# Patient Record
Sex: FEMALE | Race: WHITE | NOT HISPANIC OR LATINO | Employment: FULL TIME | URBAN - METROPOLITAN AREA
[De-identification: names, ages, dates, MRNs, and addresses within clinical notes are randomized per-mention and may not be internally consistent; named-entity substitution may affect disease eponyms.]

---

## 2017-05-10 ENCOUNTER — HOSPITAL ENCOUNTER (OUTPATIENT)
Facility: MEDICAL CENTER | Age: 32
End: 2017-05-10
Attending: NURSE PRACTITIONER
Payer: COMMERCIAL

## 2017-05-10 PROCEDURE — 88175 CYTOPATH C/V AUTO FLUID REDO: CPT

## 2017-05-10 PROCEDURE — 87591 N.GONORRHOEAE DNA AMP PROB: CPT

## 2017-05-10 PROCEDURE — 87491 CHLMYD TRACH DNA AMP PROBE: CPT

## 2017-05-11 LAB
C TRACH DNA GENITAL QL NAA+PROBE: NEGATIVE
CYTOLOGY REG CYTOL: NORMAL
N GONORRHOEA DNA GENITAL QL NAA+PROBE: NEGATIVE
SPECIMEN SOURCE: NORMAL

## 2017-06-19 ENCOUNTER — HOSPITAL ENCOUNTER (OUTPATIENT)
Dept: RADIOLOGY | Facility: MEDICAL CENTER | Age: 32
End: 2017-06-19
Attending: NURSE PRACTITIONER
Payer: COMMERCIAL

## 2017-06-19 DIAGNOSIS — N63.0 BREAST LUMP: ICD-10-CM

## 2017-06-19 DIAGNOSIS — N63.0 LUMP OR MASS IN BREAST: ICD-10-CM

## 2017-06-19 PROCEDURE — 76642 ULTRASOUND BREAST LIMITED: CPT | Mod: LT

## 2017-06-19 PROCEDURE — G0204 DX MAMMO INCL CAD BI: HCPCS

## 2017-10-19 ENCOUNTER — OFFICE VISIT (OUTPATIENT)
Dept: URGENT CARE | Facility: CLINIC | Age: 32
End: 2017-10-19
Payer: COMMERCIAL

## 2017-10-19 VITALS
WEIGHT: 180 LBS | HEART RATE: 87 BPM | SYSTOLIC BLOOD PRESSURE: 102 MMHG | DIASTOLIC BLOOD PRESSURE: 76 MMHG | OXYGEN SATURATION: 96 % | HEIGHT: 65 IN | TEMPERATURE: 98.1 F | BODY MASS INDEX: 29.99 KG/M2

## 2017-10-19 DIAGNOSIS — J02.9 PHARYNGITIS, UNSPECIFIED ETIOLOGY: ICD-10-CM

## 2017-10-19 LAB
INT CON NEG: NEGATIVE
INT CON POS: POSITIVE
S PYO AG THROAT QL: NORMAL

## 2017-10-19 PROCEDURE — 99214 OFFICE O/P EST MOD 30 MIN: CPT | Performed by: FAMILY MEDICINE

## 2017-10-19 PROCEDURE — 87880 STREP A ASSAY W/OPTIC: CPT | Performed by: FAMILY MEDICINE

## 2017-10-19 RX ORDER — AZITHROMYCIN 250 MG/1
TABLET, FILM COATED ORAL
Qty: 6 TAB | Refills: 0 | Status: SHIPPED | OUTPATIENT
Start: 2017-10-19

## 2017-10-19 ASSESSMENT — ENCOUNTER SYMPTOMS
PALPITATIONS: 0
FEVER: 0
COUGH: 0
EYE DISCHARGE: 0
SORE THROAT: 1
CHILLS: 0
SPUTUM PRODUCTION: 0
NECK PAIN: 1
VOMITING: 0
DIARRHEA: 1
NAUSEA: 0

## 2017-10-19 NOTE — LETTER
October 19, 2017         Patient: Rhiannon Elena Damico   YOB: 1985   Date of Visit: 10/19/2017           To Whom it May Concern:    Rhiannon Damico was seen in my clinic on 10/19/2017. She may return to work on 10/20/17..    If you have any questions or concerns, please don't hesitate to call.        Sincerely,           Jessica King M.D.  Electronically Signed

## 2017-10-19 NOTE — PROGRESS NOTES
Subjective:      Rhiannon Elena Damico is a 32 y.o. female who presents with Pharyngitis (X 5 days, Swollen tonsils, white spots )            Subjective:     Rhiannon Elena Damico is a 32 y.o. female who presents for evaluation of a sore throat. Associated symptoms include sore throat, headache and enlarged tonsils. She is also having neck pain with diarrhea x 4 days.Onset of symptoms was 2 days ago, gradually worsening since that time.  She is drinking plenty of fluids.. She has not had recent close exposure to someone with proven streptococcal pharyngitis.            Review of Systems   Constitutional: Negative for chills and fever.   HENT: Positive for sore throat. Negative for congestion and ear discharge.    Eyes: Negative for discharge.   Respiratory: Negative for cough and sputum production.    Cardiovascular: Negative for chest pain and palpitations.   Gastrointestinal: Positive for diarrhea. Negative for nausea and vomiting.   Musculoskeletal: Positive for neck pain.   Skin: Negative for itching and rash.          PMH:  has no past medical history on file.  MEDS:   Current Outpatient Prescriptions:   •  ValACYclovir HCl (VALTREX PO), Take  by mouth., Disp: , Rfl:   •  azithromycin (ZITHROMAX) 250 MG Tab, Take as directed, Disp: 6 Tab, Rfl: 0  •  DPH-Lido-AlHydr-MgHydr-Simeth (MAGIC MOUTHWASH BLM) Suspension, Take 5 mL by mouth every 6 hours as needed., Disp: 100 mL, Rfl: 0  •  promethazine-codeine (PHENERGAN-CODEINE) 6.25-10 MG/5ML Syrup, Take 5-10 mL by mouth 3 times a day as needed for Cough (or use qhs)., Disp: 150 mL, Rfl: 0  •  amoxicillin-clavulanate (AUGMENTIN) 875-125 MG TABS, Take 1 Tab by mouth 2 times a day., Disp: 20 Tab, Rfl: 0  •  antipyrine-benzocaine (AURALGAN) otic solution, Place 1-4 Drops in ear every 2 hours as needed (place in affected ear(s) as needed.)., Disp: 1 Bottle, Rfl: 0  •  Pyridoxine HCl (VITAMIN B-6 PO), Take  by mouth., Disp: , Rfl:   •  Naproxen Sodium (ALEVE PO), Take  by  "mouth., Disp: , Rfl:   ALLERGIES:   Allergies   Allergen Reactions   • Nkda [No Known Drug Allergy]    • Pcn [Penicillins] Rash     SURGHX: No past surgical history on file.  SOCHX:  reports that she has never smoked. She has never used smokeless tobacco. She reports that she drinks alcohol. She reports that she does not use drugs.  FH: Family history was reviewed, no pertinent findings to report     Objective:     /76   Pulse 87   Temp 36.7 °C (98.1 °F)   Ht 1.651 m (5' 5\")   Wt 81.6 kg (180 lb)   SpO2 96%   BMI 29.95 kg/m²      Physical Exam   Constitutional: She is oriented to person, place, and time. She appears well-developed and well-nourished.   HENT:   Head: Normocephalic.   Right Ear: External ear normal.   Left Ear: External ear normal.   Left tonsilar exudate    Eyes: EOM are normal. Pupils are equal, round, and reactive to light. Right eye exhibits no discharge. Left eye exhibits no discharge.   Neck: Normal range of motion. Neck supple.   Cardiovascular: Normal rate, regular rhythm and normal heart sounds.    Pulmonary/Chest: Effort normal and breath sounds normal.   Abdominal: Soft. Bowel sounds are normal. She exhibits no distension.   Neurological: She is alert and oriented to person, place, and time.   Skin: Skin is warm and dry.               Assessment/Plan:     Lab Results  RAPIDSTREP:negative           Assessment:    Viral pharyngitis.     Plan:    Use of OTC analgesics recommended as well as salt water gargles, Use of decongestant recommended, Pt advised of the risk of peritonsillar abscess formation. Abx sent with contingency plan.           "

## 2017-10-19 NOTE — PATIENT INSTRUCTIONS
Viral Pharyngitis  Viral pharyngitis is a viral infection that produces redness, pain, and swelling (inflammation) of the throat. It can spread from person to person (contagious).   CAUSES  Viral pharyngitis is caused by inhaling a large amount of certain germs called viruses. Many different viruses cause viral pharyngitis.  SYMPTOMS  Symptoms of viral pharyngitis include:  · Sore throat.  · Tiredness.  · Stuffy nose.  · Low-grade fever.  · Congestion.  · Cough.  TREATMENT  Treatment includes rest, drinking plenty of fluids, and the use of over-the-counter medication (approved by your caregiver).  HOME CARE INSTRUCTIONS   · Drink enough fluids to keep your urine clear or pale yellow.  · Eat soft, cold foods such as ice cream, frozen ice pops, or gelatin dessert.  · Gargle with warm salt water (1 tsp salt per 1 qt of water).  · If over age 7, throat lozenges may be used safely.  · Only take over-the-counter or prescription medicines for pain, discomfort, or fever as directed by your caregiver. Do not take aspirin.  To help prevent spreading viral pharyngitis to others, avoid:  · Mouth-to-mouth contact with others.  · Sharing utensils for eating and drinking.  · Coughing around others.  SEEK MEDICAL CARE IF:   · You are better in a few days, then become worse.  · You have a fever or pain not helped by pain medicines.  · There are any other changes that concern you.  Document Released: 09/27/2006 Document Revised: 03/11/2013 Document Reviewed: 02/23/2012  [employer]® Patient Information ©2014 [employer].

## 2017-11-10 ENCOUNTER — APPOINTMENT (OUTPATIENT)
Dept: RADIOLOGY | Facility: IMAGING CENTER | Age: 32
End: 2017-11-10
Attending: FAMILY MEDICINE
Payer: COMMERCIAL

## 2017-11-10 ENCOUNTER — OFFICE VISIT (OUTPATIENT)
Dept: URGENT CARE | Facility: CLINIC | Age: 32
End: 2017-11-10
Payer: COMMERCIAL

## 2017-11-10 VITALS
HEART RATE: 80 BPM | DIASTOLIC BLOOD PRESSURE: 80 MMHG | WEIGHT: 188 LBS | BODY MASS INDEX: 31.32 KG/M2 | HEIGHT: 65 IN | SYSTOLIC BLOOD PRESSURE: 122 MMHG | TEMPERATURE: 97.6 F | OXYGEN SATURATION: 97 %

## 2017-11-10 DIAGNOSIS — M25.531 RIGHT WRIST PAIN: ICD-10-CM

## 2017-11-10 PROCEDURE — 99214 OFFICE O/P EST MOD 30 MIN: CPT | Performed by: FAMILY MEDICINE

## 2017-11-10 PROCEDURE — 73110 X-RAY EXAM OF WRIST: CPT | Mod: TC,RT | Performed by: FAMILY MEDICINE

## 2017-11-10 NOTE — PROGRESS NOTES
Subjective:      Chief Complaint   Patient presents with   • Arm Injury     X 3 days, fell and landed on right arm, pain                Wrist Injury   The incident occurred 3 days ago. . The injury mechanism was a fall. The pain is present in the right wrist. The quality of the pain is described as aching. The pain does not radiate. The pain is mild. Pertinent negatives include no chest pain, muscle weakness, numbness or tingling. The symptoms are aggravated by movement and palpation. Pt has tried nothing for the symptoms.     Social History   Substance Use Topics   • Smoking status: Never Smoker   • Smokeless tobacco: Never Used   • Alcohol use Yes         No past medical history on file.      Current Outpatient Prescriptions on File Prior to Visit   Medication Sig Dispense Refill   • ValACYclovir HCl (VALTREX PO) Take  by mouth.     • azithromycin (ZITHROMAX) 250 MG Tab Use as directed with food 6 Tab 0   • azithromycin (ZITHROMAX) 250 MG Tab Take as directed 6 Tab 0   • DPH-Lido-AlHydr-MgHydr-Simeth (MAGIC MOUTHWASH BLM) Suspension Take 5 mL by mouth every 6 hours as needed. 100 mL 0   • promethazine-codeine (PHENERGAN-CODEINE) 6.25-10 MG/5ML Syrup Take 5-10 mL by mouth 3 times a day as needed for Cough (or use qhs). 150 mL 0   • amoxicillin-clavulanate (AUGMENTIN) 875-125 MG TABS Take 1 Tab by mouth 2 times a day. 20 Tab 0   • antipyrine-benzocaine (AURALGAN) otic solution Place 1-4 Drops in ear every 2 hours as needed (place in affected ear(s) as needed.). 1 Bottle 0   • Pyridoxine HCl (VITAMIN B-6 PO) Take  by mouth.     • Naproxen Sodium (ALEVE PO) Take  by mouth.       No current facility-administered medications on file prior to visit.          Review of Systems   Constitutional: Negative for fever.   Respiratory: Negative for shortness of breath.    Cardiovascular: Negative for chest pain.   Neurological: Negative for tingling and numbness.   All other systems reviewed and are negative.         Objective:  "    Blood pressure 122/80, pulse 80, temperature 36.4 °C (97.6 °F), height 1.651 m (5' 5\"), weight 85.3 kg (188 lb), SpO2 97 %.    Physical Exam   Constitutional: pt is oriented to person, place, and time. Pt appears well-developed and well-nourished. No distress.   HENT:   Head: Normocephalic and atraumatic.   Eyes: Conjunctivae are normal.   Cardiovascular: Normal rate.    Pulmonary/Chest: Effort normal.   Musculoskeletal:        Right wrist: pt exhibits some mild  swelling.  No scaphoid tenderness.   normal range of motion and no crepitus.          Right hand: Normal sensation noted. Normal strength noted.   Neurological: pt is alert and oriented to person, place, and time.   Skin: Skin is warm. Pt is not diaphoretic. No erythema.   Nursing note and vitals reviewed.         Narrative       11/10/2017 3:21 PM    HISTORY/REASON FOR EXAM:  pain  Pain/Deformity Following Trauma.      TECHNIQUE/EXAM DESCRIPTION AND NUMBER OF VIEWS:  4 views of the RIGHT wrist.    COMPARISON: None    FINDINGS:  Soft tissue swelling right wrist  No acute fracture dislocation of the right wrist identified.  Occult fracture of the scaphoid may not be visible on initial imaging and depending on clinical findings follow-up imaging may be indicated.   Impression       No acute fracture identified.   Reading Provider Reading Date   Mehul Johnson M.D. Nov 10, 2017          Assessment/Plan:       1. Right wrist pain    X-rays were personally reviewed by myself.   There is no fracture.         - Diclofenac Sodium (VOLTAREN) 1 % Gel; Apply 4 g to skin as directed 3 times a day as needed.  Dispense: 1 Tube; Refill: 0      -wrist splint    Follow up in one week if no improvement, sooner if symptoms worsen.     "

## 2018-02-24 ENCOUNTER — OFFICE VISIT (OUTPATIENT)
Dept: URGENT CARE | Facility: CLINIC | Age: 33
End: 2018-02-24
Payer: COMMERCIAL

## 2018-02-24 VITALS
WEIGHT: 189.6 LBS | HEART RATE: 80 BPM | DIASTOLIC BLOOD PRESSURE: 74 MMHG | SYSTOLIC BLOOD PRESSURE: 120 MMHG | TEMPERATURE: 97 F | OXYGEN SATURATION: 96 % | BODY MASS INDEX: 31.59 KG/M2 | HEIGHT: 65 IN

## 2018-02-24 DIAGNOSIS — J40 BRONCHITIS: ICD-10-CM

## 2018-02-24 LAB
FLUAV+FLUBV AG SPEC QL IA: NEGATIVE
INT CON NEG: NORMAL
INT CON POS: NORMAL

## 2018-02-24 PROCEDURE — 87804 INFLUENZA ASSAY W/OPTIC: CPT | Performed by: PHYSICIAN ASSISTANT

## 2018-02-24 PROCEDURE — 99214 OFFICE O/P EST MOD 30 MIN: CPT | Performed by: PHYSICIAN ASSISTANT

## 2018-02-24 RX ORDER — CEFUROXIME AXETIL 500 MG/1
500 TABLET ORAL 2 TIMES DAILY
Qty: 20 TAB | Refills: 0 | Status: SHIPPED | OUTPATIENT
Start: 2018-02-24 | End: 2018-03-06

## 2018-02-24 ASSESSMENT — ENCOUNTER SYMPTOMS
SPUTUM PRODUCTION: 1
FEVER: 0
COUGH: 1
EYES NEGATIVE: 1
CARDIOVASCULAR NEGATIVE: 1
CONSTITUTIONAL NEGATIVE: 1
SHORTNESS OF BREATH: 0
SORE THROAT: 0

## 2018-02-24 NOTE — PROGRESS NOTES
"Subjective:      Rhiannon Elena Damico is a 33 y.o. female who presents with Cough (coughing up thick green phlegm, chills, headaches, bodyaches x6 days)            Cough   This is a new problem. The current episode started in the past 7 days. The problem has been unchanged. The problem occurs every few minutes. The cough is productive of sputum. Pertinent negatives include no fever, sore throat or shortness of breath. Nothing aggravates the symptoms. She has tried nothing for the symptoms. The treatment provided no relief. There is no history of asthma.       Review of Systems   Constitutional: Negative.  Negative for fever.   HENT: Negative.  Negative for sore throat.    Eyes: Negative.    Respiratory: Positive for cough and sputum production. Negative for shortness of breath.    Cardiovascular: Negative.    Skin: Negative.           Objective:     /74   Pulse 80   Temp 36.1 °C (97 °F)   Ht 1.651 m (5' 5\")   Wt 86 kg (189 lb 9.5 oz)   SpO2 96%   BMI 31.55 kg/m²      Physical Exam   Constitutional: She is oriented to person, place, and time. She appears well-developed and well-nourished. No distress.   HENT:   Head: Normocephalic and atraumatic.   Mouth/Throat: Oropharynx is clear and moist.   Eyes: EOM are normal. Pupils are equal, round, and reactive to light.   Neck: Normal range of motion. Neck supple.   Cardiovascular: Normal rate.    Pulmonary/Chest: Effort normal and breath sounds normal. No respiratory distress. She has no wheezes. She has no rales.   Lymphadenopathy:     She has no cervical adenopathy.   Neurological: She is alert and oriented to person, place, and time.   Skin: Skin is warm and dry.   Psychiatric: She has a normal mood and affect. Her behavior is normal.   Nursing note and vitals reviewed.    Active Ambulatory Problems     Diagnosis Date Noted   • No Active Ambulatory Problems     Resolved Ambulatory Problems     Diagnosis Date Noted   • No Resolved Ambulatory Problems     No " Additional Past Medical History     Current Outpatient Prescriptions on File Prior to Visit   Medication Sig Dispense Refill   • Diclofenac Sodium (VOLTAREN) 1 % Gel Apply 4 g to skin as directed 3 times a day as needed. 1 Tube 0   • azithromycin (ZITHROMAX) 250 MG Tab Use as directed with food 6 Tab 0   • azithromycin (ZITHROMAX) 250 MG Tab Take as directed 6 Tab 0   • DPH-Lido-AlHydr-MgHydr-Simeth (MAGIC MOUTHWASH BLM) Suspension Take 5 mL by mouth every 6 hours as needed. 100 mL 0   • promethazine-codeine (PHENERGAN-CODEINE) 6.25-10 MG/5ML Syrup Take 5-10 mL by mouth 3 times a day as needed for Cough (or use qhs). 150 mL 0   • ValACYclovir HCl (VALTREX PO) Take  by mouth.     • amoxicillin-clavulanate (AUGMENTIN) 875-125 MG TABS Take 1 Tab by mouth 2 times a day. 20 Tab 0   • antipyrine-benzocaine (AURALGAN) otic solution Place 1-4 Drops in ear every 2 hours as needed (place in affected ear(s) as needed.). 1 Bottle 0   • Pyridoxine HCl (VITAMIN B-6 PO) Take  by mouth.     • Naproxen Sodium (ALEVE PO) Take  by mouth.       No current facility-administered medications on file prior to visit.      Social History     Social History   • Marital status: Single     Spouse name: N/A   • Number of children: N/A   • Years of education: N/A     Occupational History   • Not on file.     Social History Main Topics   • Smoking status: Never Smoker   • Smokeless tobacco: Never Used   • Alcohol use Yes   • Drug use: No   • Sexual activity: Not on file     Other Topics Concern   • Not on file     Social History Narrative   • No narrative on file     History reviewed. No pertinent family history.  Nkda [no known drug allergy] and Pcn [penicillins]         Flu ng     Assessment/Plan:     ·  bronchitis      · Start w/MucinexD; nsaids; [hold rx abx prn sx persist/worsen]  ·

## 2019-02-04 ENCOUNTER — OFFICE VISIT (OUTPATIENT)
Dept: URGENT CARE | Facility: CLINIC | Age: 34
End: 2019-02-04
Payer: COMMERCIAL

## 2019-02-04 VITALS
HEART RATE: 74 BPM | RESPIRATION RATE: 18 BRPM | HEIGHT: 65 IN | SYSTOLIC BLOOD PRESSURE: 122 MMHG | OXYGEN SATURATION: 96 % | TEMPERATURE: 97.2 F | BODY MASS INDEX: 32.49 KG/M2 | DIASTOLIC BLOOD PRESSURE: 80 MMHG | WEIGHT: 195 LBS

## 2019-02-04 DIAGNOSIS — H92.09 OTALGIA, UNSPECIFIED LATERALITY: ICD-10-CM

## 2019-02-04 DIAGNOSIS — J06.9 UPPER RESPIRATORY TRACT INFECTION, UNSPECIFIED TYPE: ICD-10-CM

## 2019-02-04 DIAGNOSIS — H93.8X9 SENSATION OF FULLNESS IN EAR, UNSPECIFIED LATERALITY: ICD-10-CM

## 2019-02-04 PROCEDURE — 99214 OFFICE O/P EST MOD 30 MIN: CPT | Performed by: PHYSICIAN ASSISTANT

## 2019-02-04 RX ORDER — CETIRIZINE HYDROCHLORIDE, PSEUDOEPHEDRINE HYDROCHLORIDE 5; 120 MG/1; MG/1
1 TABLET, FILM COATED, EXTENDED RELEASE ORAL 2 TIMES DAILY
Qty: 60 TAB | Refills: 1 | Status: SHIPPED | OUTPATIENT
Start: 2019-02-04

## 2019-02-04 RX ORDER — AMOXICILLIN AND CLAVULANATE POTASSIUM 875; 125 MG/1; MG/1
1 TABLET, FILM COATED ORAL 2 TIMES DAILY
Qty: 20 TAB | Refills: 0 | Status: SHIPPED | OUTPATIENT
Start: 2019-02-04

## 2019-02-04 RX ORDER — FLUTICASONE PROPIONATE 50 MCG
2 SPRAY, SUSPENSION (ML) NASAL DAILY
Qty: 16 G | Refills: 0 | Status: SHIPPED | OUTPATIENT
Start: 2019-02-04

## 2019-02-04 ASSESSMENT — ENCOUNTER SYMPTOMS
NAUSEA: 0
SHORTNESS OF BREATH: 0
FEVER: 0
CHILLS: 0
WHEEZING: 0
ABDOMINAL PAIN: 0
VOMITING: 0
DIARRHEA: 0

## 2019-02-05 NOTE — PROGRESS NOTES
"  Subjective:     Rhiannon Elena Damico is a 34 y.o. female who presents for Otalgia (x 5 days, bilateral ear pain, jaw pain and headaches)       Notes last month w/ abx for AOM, some resolution of ear pain bilat, notes some pressures discomfort down over sinus and jaw, notes jaw discomfort denies fever/ chills or cough, was tx';ed w/ omnicef, tried some ice, tried tylenol and ibu w/ no relief, \"it doesn't hurt now but yesterday was bad\", denies PMH of similar, PMH of AOM in adulthood, denies nasuea/vomiting/abdpain/diarhea/rash. PMH of AOM besides last month was years ago, denies PMH of asthma, PMH of bronchitis w/in last one year, denies PMH of pneumonia, denies seasonal allerg, tried tylenol/naprxoen/ibu.       Otalgia    There is pain in both ears. The current episode started in the past 7 days. Associated symptoms include coughing. Pertinent negatives include no abdominal pain, diarrhea, rash, sore throat or vomiting.   No past medical history on file.No past surgical history on file.  Social History     Social History   • Marital status: Single     Spouse name: N/A   • Number of children: N/A   • Years of education: N/A     Occupational History   • Not on file.     Social History Main Topics   • Smoking status: Never Smoker   • Smokeless tobacco: Never Used   • Alcohol use Yes   • Drug use: No   • Sexual activity: Not on file     Other Topics Concern   • Not on file     Social History Narrative   • No narrative on file    No family history on file. Review of Systems   Constitutional: Negative for chills and fever.   HENT: Positive for congestion and ear pain. Negative for sore throat.    Respiratory: Positive for cough. Negative for sputum production, shortness of breath and wheezing.    Gastrointestinal: Negative for abdominal pain, diarrhea, nausea and vomiting.   Skin: Negative for rash.      Allergies   Allergen Reactions   • Nkda [No Known Drug Allergy]    • Pcn [Penicillins] Rash   I have worn a mask for " "the entire encounter with this patient.    Objective:   /80 (BP Location: Left arm, Patient Position: Sitting, BP Cuff Size: Adult)   Pulse 74   Temp 36.2 °C (97.2 °F) (Temporal)   Resp 18   Ht 1.651 m (5' 5\")   Wt 88.5 kg (195 lb)   SpO2 96%   BMI 32.45 kg/m²   Physical Exam   Constitutional: She is oriented to person, place, and time. She appears well-developed and well-nourished. No distress.   HENT:   Head: Normocephalic and atraumatic.   Right Ear: Tympanic membrane, external ear and ear canal normal.   Left Ear: Tympanic membrane, external ear and ear canal normal.   Nose: Nose normal.   Mouth/Throat: Uvula is midline and mucous membranes are normal. Posterior oropharyngeal erythema ( mild PND) present. No oropharyngeal exudate, posterior oropharyngeal edema or tonsillar abscesses.   Eyes: Conjunctivae and lids are normal. Right eye exhibits no discharge. Left eye exhibits no discharge. No scleral icterus.   Neck: Neck supple.   Pulmonary/Chest: Effort normal. No respiratory distress. She has no decreased breath sounds. She has no wheezes. She has no rhonchi. She has no rales.   Musculoskeletal: Normal range of motion.   Lymphadenopathy:     She has cervical adenopathy ( mild bilat).   Neurological: She is alert and oriented to person, place, and time. She is not disoriented.   Skin: Skin is warm and dry. She is not diaphoretic. No erythema. No pallor.   Psychiatric: Her speech is normal and behavior is normal.   Nursing note and vitals reviewed.        Assessment/Plan:   Assessment    1. Upper respiratory tract infection, unspecified type    2. Otalgia, unspecified laterality  - fluticasone (FLONASE) 50 MCG/ACT nasal spray; Spray 2 Sprays in nose every day.  Dispense: 16 g; Refill: 0  - cetirizine-psuedoephedrine (ZYRTEC-D ALLERGY & CONGESTION) 5-120 MG per tablet; Take 1 Tab by mouth 2 times a day.  Dispense: 60 Tab; Refill: 1  - amoxicillin-clavulanate (AUGMENTIN) 875-125 MG Tab; Take 1 Tab by " mouth 2 times a day.  Dispense: 20 Tab; Refill: 0    3. Sensation of fullness in ear, unspecified laterality  Supportive care is reviewed with patient/caregiver - recommend to push PO fluids and electrolytes, Nsaids/tylenol, netti pot/saline irrig, humidifier in home, flonase, ponaris, antihistamine, decongestant, Contingent antibiotic prescription given to patient to fill upon meeting criteria of guidelines discussed.   If filling,  take full course of Rx, take with probiotics, observe for resolution  Return to clinic with lack of resolution or progression of symptoms.    Differential diagnosis, natural history, supportive care, and indications for immediate follow-up discussed.

## 2019-02-07 ASSESSMENT — ENCOUNTER SYMPTOMS
SORE THROAT: 0
COUGH: 1
SPUTUM PRODUCTION: 0

## 2019-12-12 ENCOUNTER — OFFICE VISIT (OUTPATIENT)
Dept: URGENT CARE | Facility: CLINIC | Age: 34
End: 2019-12-12
Payer: OTHER MISCELLANEOUS

## 2019-12-12 VITALS
RESPIRATION RATE: 17 BRPM | WEIGHT: 183 LBS | HEIGHT: 65 IN | TEMPERATURE: 98.6 F | OXYGEN SATURATION: 96 % | DIASTOLIC BLOOD PRESSURE: 76 MMHG | BODY MASS INDEX: 30.49 KG/M2 | HEART RATE: 94 BPM | SYSTOLIC BLOOD PRESSURE: 118 MMHG

## 2019-12-12 DIAGNOSIS — J02.9 PHARYNGITIS, UNSPECIFIED ETIOLOGY: ICD-10-CM

## 2019-12-12 LAB
HETEROPH AB SER QL LA: NEGATIVE
INT CON NEG: NORMAL
INT CON NEG: NORMAL
INT CON POS: NORMAL
INT CON POS: NORMAL
S PYO AG THROAT QL: NEGATIVE

## 2019-12-12 PROCEDURE — 86308 HETEROPHILE ANTIBODY SCREEN: CPT | Performed by: NURSE PRACTITIONER

## 2019-12-12 PROCEDURE — 87880 STREP A ASSAY W/OPTIC: CPT | Performed by: NURSE PRACTITIONER

## 2019-12-12 PROCEDURE — 99213 OFFICE O/P EST LOW 20 MIN: CPT | Performed by: NURSE PRACTITIONER

## 2019-12-12 ASSESSMENT — ENCOUNTER SYMPTOMS
FEVER: 0
FOCAL WEAKNESS: 0
HEADACHES: 1
SHORTNESS OF BREATH: 0
SORE THROAT: 1
COUGH: 1
CONSTITUTIONAL NEGATIVE: 1
SENSORY CHANGE: 0
NECK PAIN: 1
CHILLS: 0

## 2019-12-12 NOTE — PROGRESS NOTES
Subjective:     Rhiannon Elena Damico is a 34 y.o. female who presents for Cough (dry cough , sore throat , headaches  , neck pain x 2 weeks )       Cough   This is a new problem. The problem has been gradually worsening. The cough is non-productive. Associated symptoms include headaches and a sore throat. Pertinent negatives include no chills, fever or shortness of breath. She has tried nothing for the symptoms.     Patient reports that 2 weeks ago she had an upper respiratory tract infection and symptoms which resolved till about 2 days ago when new symptoms started. Has had her flu shot this season.    PMH:  has no past medical history on file.    MEDS:   Current Outpatient Medications:   •  fluticasone (FLONASE) 50 MCG/ACT nasal spray, Spray 2 Sprays in nose every day. (Patient not taking: Reported on 12/12/2019), Disp: 16 g, Rfl: 0  •  cetirizine-psuedoephedrine (ZYRTEC-D ALLERGY & CONGESTION) 5-120 MG per tablet, Take 1 Tab by mouth 2 times a day. (Patient not taking: Reported on 12/12/2019), Disp: 60 Tab, Rfl: 1  •  amoxicillin-clavulanate (AUGMENTIN) 875-125 MG Tab, Take 1 Tab by mouth 2 times a day. (Patient not taking: Reported on 12/12/2019), Disp: 20 Tab, Rfl: 0  •  Diclofenac Sodium (VOLTAREN) 1 % Gel, Apply 4 g to skin as directed 3 times a day as needed. (Patient not taking: Reported on 12/12/2019), Disp: 1 Tube, Rfl: 0  •  azithromycin (ZITHROMAX) 250 MG Tab, Use as directed with food (Patient not taking: Reported on 12/12/2019), Disp: 6 Tab, Rfl: 0  •  azithromycin (ZITHROMAX) 250 MG Tab, Take as directed (Patient not taking: Reported on 12/12/2019), Disp: 6 Tab, Rfl: 0  •  DPH-Lido-AlHydr-MgHydr-Simeth (MAGIC MOUTHWASH BLM) Suspension, Take 5 mL by mouth every 6 hours as needed. (Patient not taking: Reported on 12/12/2019), Disp: 100 mL, Rfl: 0  •  promethazine-codeine (PHENERGAN-CODEINE) 6.25-10 MG/5ML Syrup, Take 5-10 mL by mouth 3 times a day as needed for Cough (or use qhs). (Patient not  "taking: Reported on 12/12/2019), Disp: 150 mL, Rfl: 0  •  ValACYclovir HCl (VALTREX PO), Take  by mouth., Disp: , Rfl:   •  amoxicillin-clavulanate (AUGMENTIN) 875-125 MG TABS, Take 1 Tab by mouth 2 times a day. (Patient not taking: Reported on 12/12/2019), Disp: 20 Tab, Rfl: 0  •  antipyrine-benzocaine (AURALGAN) otic solution, Place 1-4 Drops in ear every 2 hours as needed (place in affected ear(s) as needed.). (Patient not taking: Reported on 12/12/2019), Disp: 1 Bottle, Rfl: 0  •  Pyridoxine HCl (VITAMIN B-6 PO), Take  by mouth., Disp: , Rfl:   •  Naproxen Sodium (ALEVE PO), Take  by mouth., Disp: , Rfl:     ALLERGIES:   Allergies   Allergen Reactions   • Nkda [No Known Drug Allergy]    • Pcn [Penicillins] Rash     SURGHX: History reviewed. No pertinent surgical history.    SOCHX:  reports that she has never smoked. She has never used smokeless tobacco. She reports current alcohol use. She reports that she does not use drugs.     FH: Reviewed with patient, not pertinent to this visit.    Review of Systems   Constitutional: Negative.  Negative for chills, fever and malaise/fatigue.   HENT: Positive for sore throat. Negative for congestion.    Respiratory: Positive for cough. Negative for shortness of breath.    Musculoskeletal: Positive for neck pain.   Neurological: Positive for headaches. Negative for sensory change and focal weakness.   All other systems reviewed and are negative.    Objective:     /76 (BP Location: Left arm, Patient Position: Sitting, BP Cuff Size: Adult)   Pulse 94   Temp 37 °C (98.6 °F) (Temporal)   Resp 17   Ht 1.651 m (5' 5\")   Wt 83 kg (183 lb)   SpO2 96%   BMI 30.45 kg/m²     Physical Exam  Vitals signs reviewed.   Constitutional:       General: She is not in acute distress.     Appearance: She is well-developed. She is not toxic-appearing or diaphoretic.   HENT:      Head: Normocephalic.      Right Ear: Tympanic membrane and external ear normal.      Left Ear: Tympanic " membrane and external ear normal.      Nose: Nose normal.      Mouth/Throat:      Mouth: Mucous membranes are moist.      Pharynx: Uvula midline. Pharyngeal swelling and posterior oropharyngeal erythema present.   Eyes:      Extraocular Movements: Extraocular movements intact.      Conjunctiva/sclera: Conjunctivae normal.      Pupils: Pupils are equal, round, and reactive to light.   Neck:      Musculoskeletal: Normal range of motion.   Cardiovascular:      Rate and Rhythm: Normal rate and regular rhythm.      Pulses: Normal pulses.      Heart sounds: Normal heart sounds.   Pulmonary:      Effort: Pulmonary effort is normal. No respiratory distress.      Breath sounds: Normal breath sounds. No decreased breath sounds.   Abdominal:      General: Bowel sounds are normal.      Palpations: Abdomen is soft.      Tenderness: There is no tenderness.   Musculoskeletal: Normal range of motion.         General: No deformity.   Lymphadenopathy:      Cervical: No cervical adenopathy.   Skin:     General: Skin is warm and dry.      Capillary Refill: Capillary refill takes less than 2 seconds.      Coloration: Skin is not pale.   Neurological:      Mental Status: She is alert and oriented to person, place, and time.      Sensory: No sensory deficit.      Coordination: Coordination normal.   Psychiatric:         Behavior: Behavior normal. Behavior is cooperative.       Rapid Strep A swab: negative    POCT Mono: negative       Assessment/Plan:     1. Pharyngitis, unspecified etiology  - POCT Rapid Strep A  - POCT Mononucleosis (mono)    Discussed likely self-limiting viral etiology and expected course and duration of illness.    Discussed close monitoring and supportive measures including increasing fluids, warm salt water gargles, and rest as well as OTC symptom management including acetaminophen and/or ibuprofen PRN pain and/or fever.    Vital signs stable, afebrile, asymptomatic, no acute distress.    Warning signs reviewed.  Return precautions discussed.    Patient advised to: Return for 1) Symptoms don't improve or worsen, or go to ER, 2) Follow up with primary care in 7-10 days.    Differential diagnosis, natural history, supportive care, and indications for immediate follow-up discussed. All questions answered. Patient agrees with the plan of care.